# Patient Record
Sex: MALE | Race: WHITE | HISPANIC OR LATINO | Employment: FULL TIME | URBAN - METROPOLITAN AREA
[De-identification: names, ages, dates, MRNs, and addresses within clinical notes are randomized per-mention and may not be internally consistent; named-entity substitution may affect disease eponyms.]

---

## 2017-01-18 ENCOUNTER — HOSPITAL ENCOUNTER (EMERGENCY)
Facility: HOSPITAL | Age: 52
Discharge: HOME | End: 2017-01-18
Attending: EMERGENCY MEDICINE

## 2017-01-18 VITALS
BODY MASS INDEX: 30.06 KG/M2 | HEART RATE: 60 BPM | TEMPERATURE: 98 F | SYSTOLIC BLOOD PRESSURE: 146 MMHG | OXYGEN SATURATION: 100 % | DIASTOLIC BLOOD PRESSURE: 66 MMHG | WEIGHT: 210 LBS | HEIGHT: 70 IN | RESPIRATION RATE: 20 BRPM

## 2017-01-18 DIAGNOSIS — R10.9 FLANK PAIN: ICD-10-CM

## 2017-01-18 DIAGNOSIS — R31.9 HEMATURIA: Primary | ICD-10-CM

## 2017-01-18 LAB
ALBUMIN SERPL BCP-MCNC: 4.4 G/DL
ALP SERPL-CCNC: 85 U/L
ALT SERPL W/O P-5'-P-CCNC: 31 U/L
ANION GAP SERPL CALC-SCNC: 9 MMOL/L
AST SERPL-CCNC: 22 U/L
BASOPHILS # BLD AUTO: 0.03 K/UL
BASOPHILS NFR BLD: 0.5 %
BILIRUB SERPL-MCNC: 0.6 MG/DL
BILIRUB UR QL STRIP: NEGATIVE
BUN SERPL-MCNC: 22 MG/DL
CALCIUM SERPL-MCNC: 9.3 MG/DL
CHLORIDE SERPL-SCNC: 108 MMOL/L
CLARITY UR: CLEAR
CO2 SERPL-SCNC: 24 MMOL/L
COLOR UR: YELLOW
CREAT SERPL-MCNC: 1.3 MG/DL
DIFFERENTIAL METHOD: ABNORMAL
EOSINOPHIL # BLD AUTO: 0.2 K/UL
EOSINOPHIL NFR BLD: 3 %
ERYTHROCYTE [DISTWIDTH] IN BLOOD BY AUTOMATED COUNT: 12.4 %
EST. GFR  (AFRICAN AMERICAN): >60 ML/MIN/1.73 M^2
EST. GFR  (NON AFRICAN AMERICAN): >60 ML/MIN/1.73 M^2
GLUCOSE SERPL-MCNC: 144 MG/DL
GLUCOSE UR QL STRIP: NEGATIVE
HCT VFR BLD AUTO: 49.5 %
HGB BLD-MCNC: 16.8 G/DL
HGB UR QL STRIP: ABNORMAL
KETONES UR QL STRIP: NEGATIVE
LEUKOCYTE ESTERASE UR QL STRIP: NEGATIVE
LIPASE SERPL-CCNC: 15 U/L
LYMPHOCYTES # BLD AUTO: 1.4 K/UL
LYMPHOCYTES NFR BLD: 20.7 %
MCH RBC QN AUTO: 30.9 PG
MCHC RBC AUTO-ENTMCNC: 33.9 %
MCV RBC AUTO: 91 FL
MICROSCOPIC COMMENT: ABNORMAL
MONOCYTES # BLD AUTO: 0.4 K/UL
MONOCYTES NFR BLD: 6.2 %
NEUTROPHILS # BLD AUTO: 4.6 K/UL
NEUTROPHILS NFR BLD: 69.6 %
NITRITE UR QL STRIP: NEGATIVE
PH UR STRIP: 5 [PH] (ref 5–8)
PLATELET # BLD AUTO: 148 K/UL
PMV BLD AUTO: 10.9 FL
POTASSIUM SERPL-SCNC: 3.9 MMOL/L
PROT SERPL-MCNC: 7.8 G/DL
PROT UR QL STRIP: NEGATIVE
RBC # BLD AUTO: 5.44 M/UL
RBC #/AREA URNS HPF: 12 /HPF (ref 0–4)
SODIUM SERPL-SCNC: 141 MMOL/L
SP GR UR STRIP: 1.01 (ref 1–1.03)
URN SPEC COLLECT METH UR: ABNORMAL
UROBILINOGEN UR STRIP-ACNC: NEGATIVE EU/DL
WBC # BLD AUTO: 6.61 K/UL

## 2017-01-18 PROCEDURE — 63600175 PHARM REV CODE 636 W HCPCS: Performed by: EMERGENCY MEDICINE

## 2017-01-18 PROCEDURE — 83690 ASSAY OF LIPASE: CPT

## 2017-01-18 PROCEDURE — 80053 COMPREHEN METABOLIC PANEL: CPT

## 2017-01-18 PROCEDURE — 96361 HYDRATE IV INFUSION ADD-ON: CPT

## 2017-01-18 PROCEDURE — 99284 EMERGENCY DEPT VISIT MOD MDM: CPT | Mod: 25

## 2017-01-18 PROCEDURE — 96375 TX/PRO/DX INJ NEW DRUG ADDON: CPT

## 2017-01-18 PROCEDURE — 96365 THER/PROPH/DIAG IV INF INIT: CPT

## 2017-01-18 PROCEDURE — 25000003 PHARM REV CODE 250: Performed by: EMERGENCY MEDICINE

## 2017-01-18 PROCEDURE — 85025 COMPLETE CBC W/AUTO DIFF WBC: CPT

## 2017-01-18 PROCEDURE — 81000 URINALYSIS NONAUTO W/SCOPE: CPT

## 2017-01-18 RX ORDER — ONDANSETRON 4 MG/1
4 TABLET, FILM COATED ORAL EVERY 8 HOURS PRN
Qty: 12 TABLET | Refills: 0 | Status: SHIPPED | OUTPATIENT
Start: 2017-01-18

## 2017-01-18 RX ORDER — ETODOLAC 400 MG/1
400 TABLET, FILM COATED ORAL 2 TIMES DAILY
Qty: 30 TABLET | Refills: 0 | Status: SHIPPED | OUTPATIENT
Start: 2017-01-18

## 2017-01-18 RX ORDER — KETOROLAC TROMETHAMINE 30 MG/ML
15 INJECTION, SOLUTION INTRAMUSCULAR; INTRAVENOUS
Status: COMPLETED | OUTPATIENT
Start: 2017-01-18 | End: 2017-01-18

## 2017-01-18 RX ORDER — HYDROMORPHONE HYDROCHLORIDE 2 MG/ML
1 INJECTION, SOLUTION INTRAMUSCULAR; INTRAVENOUS; SUBCUTANEOUS
Status: COMPLETED | OUTPATIENT
Start: 2017-01-18 | End: 2017-01-18

## 2017-01-18 RX ORDER — ONDANSETRON 2 MG/ML
4 INJECTION INTRAMUSCULAR; INTRAVENOUS
Status: COMPLETED | OUTPATIENT
Start: 2017-01-18 | End: 2017-01-18

## 2017-01-18 RX ADMIN — SODIUM CHLORIDE 1000 ML: 0.9 INJECTION, SOLUTION INTRAVENOUS at 08:01

## 2017-01-18 RX ADMIN — ONDANSETRON 4 MG: 2 INJECTION INTRAMUSCULAR; INTRAVENOUS at 08:01

## 2017-01-18 RX ADMIN — KETOROLAC TROMETHAMINE 15 MG: 30 INJECTION, SOLUTION INTRAMUSCULAR at 08:01

## 2017-01-18 RX ADMIN — HYDROMORPHONE HYDROCHLORIDE 1 MG: 2 INJECTION INTRAMUSCULAR; INTRAVENOUS; SUBCUTANEOUS at 08:01

## 2017-01-18 RX ADMIN — PROMETHAZINE HYDROCHLORIDE 25 MG: 25 INJECTION INTRAMUSCULAR; INTRAVENOUS at 09:01

## 2017-01-18 NOTE — ED AVS SNAPSHOT
[facility]  [address]  [place] [address]               Bill Hathaway   2017  7:45 AM   ED    Descripción:  Male : 1965   Departamento:  [facility]           Schroeder Cuidado fue coordinado por:     Provider Role From To    Sandeep Louis MD Attending Provider 17 0745 --      Razón de la tammie     Abdominal Pain           Diagnósticos de Esta Visita        Comentarios    Hematuria    -  Primario     Flank pain           ED Disposition     Ninguna           Lista de tareas           Información de seguimiento     Realice un seguimiento con:  ERASMO Golden MD    Cómo:  Luciano hubert tammie lo antes posible    Cuándo:  2017    Especialidad:  Urology    Por qué:  As needed    Información de contacto:    [address] [place] [address]  [address]  [place] [address]  [phone]        Recetas para recoger        Disp Refills Start End    etodolac (LODINE) 400 MG tablet 30 tablet 0 2017     Take 1 tablet (400 mg total) by mouth 2 (two) times daily. - Oral    ondansetron (ZOFRAN) 4 MG tablet 12 tablet 0 2017     Take 1 tablet (4 mg total) by mouth every 8 (eight) hours as needed. - Oral      [facility]     [facility] - Asistencia   Enfermeras registradas de [facility] pueden ayudarle a reservar hubert tammie, proveer educación para la ct, asesoría clínica, y otros servicios de asesoramiento.   Llame para bryanna servicio gratuito a [phone].             Medicamentos           EMPEZAR a ibrahima estos medicamentos NUEVOS        Refills    etodolac (LODINE) 400 MG tablet 0    Sig: Take 1 tablet (400 mg total) by mouth 2 (two) times daily.    Categoría: Print    Vía: Oral    ondansetron (ZOFRAN) 4 MG tablet 0    Sig: Take 1 tablet (4 mg total) by mouth every 8 (eight) hours as needed.    Categoría: Print    Vía: Oral      These medications were administered today        Dose Freq    ketorolac injection 15 mg 15 mg  "ED 1 Time    Sig: Inject 15 mg into the vein ED 1 Time.    Categoría: Normal    Vía: Intravenous    ondansetron injection 4 mg 4 mg ED 1 Time    Sig: Inject 4 mg into the vein ED 1 Time.    Categoría: Normal    Vía: Intravenous    sodium chloride 0.9% bolus 1,000 mL 1,000 mL ED 1 Time    Sig: Inject 1,000 mLs into the vein ED 1 Time.    Categoría: Normal    Vía: Intravenous    hydromorphone (PF) injection 1 mg 1 mg ED 1 Time    Sig: Inject 0.5 mLs (1 mg total) into the vein ED 1 Time.    Categoría: Normal    Vía: Intravenous    promethazine (PHENERGAN) 25 mg in dextrose 5 % 50 mL IVPB 25 mg ED 1 Time    Sig: Inject 25 mg into the vein ED 1 Time.    Categoría: Normal    Vía: Intravenous           Verifique que la siguiente lista de medicamentos es hubert representación exacta de los medicamentos que está tomando actualmente. Si no hay ningunos reportados, la lista puede estar en herr. Si no es correcta, por favor póngase en contacto con pimentel proveedor de atención médica. Lleve esta lista con usted en jorge de emergencia.           Medicamentos Actuales     etodolac (LODINE) 400 MG tablet Take 1 tablet (400 mg total) by mouth 2 (two) times daily.    ondansetron (ZOFRAN) 4 MG tablet Take 1 tablet (4 mg total) by mouth every 8 (eight) hours as needed.    promethazine (PHENERGAN) 25 mg in dextrose 5 % 50 mL IVPB Inject 25 mg into the vein ED 1 Time.           Información de referencia clínica           Ginger signos vitales arthur     PS Pulso Temperatura Resp Vidalia Peso    138/86 (BP Location: Left arm, Patient Position: Sitting) 62 97.8 °F (36.6 °C) (Oral) 18 5' 10" (1.778 m) 95.3 kg (210 lb)    SpO2 BMI (IMC)                96% 30.13 kg/m2          Alergias     A partir del:  1/18/2017        No Known Allergies      Vacunas     Administradas en la fecha de la visita:  1/18/2017        None      ED Micro, Lab, POCT     Start Ordered       Status Ordering Provider    01/18/17 0753 01/18/17 0753  CBC auto differential  STAT      " Final result     17 0753 17 0753  Comprehensive metabolic panel  STAT      Final result     17 0753 17 0753  Lipase  STAT      Final result     17 0753 17 0753  Urinalysis  STAT      Final result     17 0753 17 0753  Urinalysis Microscopic  Once      Final result       ED Imaging Orders     Start Ordered       Status Ordering Provider    17 0753 17 0753  CT Renal Stone Study ABD Pelvis WO  1 time imaging      Final result       Referencias/Adjuntos de jose     HEMATURIA (Maltese)    HEMATURIA: POSSIBLE CAUSES (Maltese)       [facility] cumple con las leyes federales aplicables de derechos civiles y no discrimina por motivos de kalina, color, origen nacional, edad, discapacidad, o sexo.        Language Assistance Services     ATTENTION: Language assistance services are available, free of charge. Please call [phone].      ATENCIÓN: Si habla español, tiene a pimentel disposición servicios gratuitos de asistencia lingüística. Llame al [phone].     CHÚ Ý: N?u b?n nói Ti?ng Vi?t, có các d?ch v? h? tr? ngôn ng? mi?n phí dành cho b?n. G?i s? [phone].                      [facility]  [address] [place] [address]               Bill Hathaway   2017  7:45 AM   ED    Description:  Male : 1965   Department:  [facility]           Your Care was Coordinated By:     Provider Role From To    Sandeep Louis MD Attending Provider 17 0751 --      Reason for Visit     Abdominal Pain           Diagnoses this Visit        Comments    Hematuria    -  Primary     Flank pain           ED Disposition     None           To Do List           Follow-up Information     Follow up with ERASMO Golden MD. Schedule an appointment as soon as possible for a visit in 1 week.    Specialty:  Urology    Why:  As needed    Contact information:    [address]  [address]  [place]  [address]  [phone]         These Medications        Disp Refills Start End    etodolac (LODINE) 400 MG tablet 30 tablet 0 1/18/2017     Take 1 tablet (400 mg total) by mouth 2 (two) times daily. - Oral    ondansetron (ZOFRAN) 4 MG tablet 12 tablet 0 1/18/2017     Take 1 tablet (4 mg total) by mouth every 8 (eight) hours as needed. - Oral      [facility]     [facility] Care [facility] - 24/7 Assistance  Registered nurses in the [facility] provide clinical advisement, health education, appointment booking, and other advisory services.  Call for this free service at [phone].             Medications           START taking these NEW medications        Refills    etodolac (LODINE) 400 MG tablet 0    Sig: Take 1 tablet (400 mg total) by mouth 2 (two) times daily.    Class: Print    Route: Oral    ondansetron (ZOFRAN) 4 MG tablet 0    Sig: Take 1 tablet (4 mg total) by mouth every 8 (eight) hours as needed.    Class: Print    Route: Oral      These medications were administered today        Dose Freq    ketorolac injection 15 mg 15 mg ED 1 Time    Sig: Inject 15 mg into the vein ED 1 Time.    Class: Normal    Route: Intravenous    ondansetron injection 4 mg 4 mg ED 1 Time    Sig: Inject 4 mg into the vein ED 1 Time.    Class: Normal    Route: Intravenous    sodium chloride 0.9% bolus 1,000 mL 1,000 mL ED 1 Time    Sig: Inject 1,000 mLs into the vein ED 1 Time.    Class: Normal    Route: Intravenous    hydromorphone (PF) injection 1 mg 1 mg ED 1 Time    Sig: Inject 0.5 mLs (1 mg total) into the vein ED 1 Time.    Class: Normal    Route: Intravenous    promethazine (PHENERGAN) 25 mg in dextrose 5 % 50 mL IVPB 25 mg ED 1 Time    Sig: Inject 25 mg into the vein ED 1 Time.    Class: Normal    Route: Intravenous           Verify that the below list of medications is an accurate representation of the medications you are currently taking.  If none reported, the list may be blank. If incorrect,  "please contact your healthcare provider. Carry this list with you in case of emergency.           Current Medications     etodolac (LODINE) 400 MG tablet Take 1 tablet (400 mg total) by mouth 2 (two) times daily.    ondansetron (ZOFRAN) 4 MG tablet Take 1 tablet (4 mg total) by mouth every 8 (eight) hours as needed.    promethazine (PHENERGAN) 25 mg in dextrose 5 % 50 mL IVPB Inject 25 mg into the vein ED 1 Time.           Clinical Reference Information           Your Vitals Were     BP Pulse Temp Resp Height Weight    138/86 (BP Location: Left arm, Patient Position: Sitting) 62 97.8 °F (36.6 °C) (Oral) 18 5' 10" (1.778 m) 95.3 kg (210 lb)    SpO2 BMI                96% 30.13 kg/m2          Allergies as of 1/18/2017     No Known Allergies      Immunizations Administered on Date of Encounter - 1/18/2017     None      ED Micro, Lab, POCT     Start Ordered       Status Ordering Provider    01/18/17 0753 01/18/17 0753  CBC auto differential  STAT      Final result     01/18/17 0753 01/18/17 0753  Comprehensive metabolic panel  STAT      Final result     01/18/17 0753 01/18/17 0753  Lipase  STAT      Final result     01/18/17 0753 01/18/17 0753  Urinalysis  STAT      Final result     01/18/17 0753 01/18/17 0753  Urinalysis Microscopic  Once      Final result       ED Imaging Orders     Start Ordered       Status Ordering Provider    01/18/17 0753 01/18/17 0753  CT Renal Stone Study ABD Pelvis WO  1 time imaging      Final result       Discharge References/Attachments     HEMATURIA (Swiss)    HEMATURIA: POSSIBLE CAUSES (Swiss)       [facility] complies with applicable Federal civil rights laws and does not discriminate on the basis of race, color, national origin, age, disability, or sex.        Language Assistance Services     ATTENTION: Language assistance services are available, free of charge. Please call [phone].      ATENCIÓN: Si sunshinela hernan, tiene a pimentel disposición servicios gratuitos " de asistencia lingüística. Henry hitchcock [phone].     FREEMAN Ý: N?u b?n nói Ti?ng Vi?t, có các d?ch v? h? tr? ngôn ng? mi?n phí rebelh cho b?n. G?i s? [phone].

## 2017-01-18 NOTE — ED PROVIDER NOTES
"Encounter Date: 1/18/2017    SCRIBE #1 NOTE: I, Elver Shen, am scribing for, and in the presence of, Sandeep Louis MD. Other sections scribed: HPI, ROS.       History     Chief Complaint   Patient presents with    Abdominal Pain     reporting pain to left lower quad abdominal and left flank onset this morning, "comes and goes," states felt like he had to urinate this morning, but was unable to go "any more than a little bit," has been drinking fluids, last BM this morning, denies medical hx     Review of patient's allergies indicates:  No Known Allergies  HPI Comments: CC: Abdominal Pain  HPI: This 51 y.o. male presents to the ED c/o low abdominal pain and urinary urgency since earlier this morning. He reports difficulty urinating and a few moments of L flank pain when he attempted to void this morning. Sx are moderate and acute. He denies fever, vomiting, diarrhea, hematuria.      The history is provided by the patient.     History reviewed. No pertinent past medical history.  No past medical history pertinent negatives.  History reviewed. No pertinent past surgical history.  History reviewed. No pertinent family history.  Social History   Substance Use Topics    Smoking status: Never Smoker    Smokeless tobacco: None    Alcohol use No     Review of Systems   Constitutional: Negative for chills and fever.   HENT: Negative for congestion, rhinorrhea and sore throat.    Eyes: Negative for pain and visual disturbance.   Respiratory: Negative for cough and shortness of breath.    Cardiovascular: Negative for chest pain.   Gastrointestinal: Positive for abdominal pain. Negative for diarrhea and vomiting.   Genitourinary: Positive for difficulty urinating, flank pain (L) and urgency. Negative for dysuria.   Musculoskeletal: Negative for arthralgias and myalgias.   Skin: Negative for rash and wound.   Neurological: Negative for headaches.       Physical Exam   Initial Vitals   BP Pulse Resp Temp SpO2   01/18/17 " 0739 01/18/17 0739 01/18/17 0739 01/18/17 0739 01/18/17 0739   138/86 62 18 97.8 °F (36.6 °C) 96 %     Physical Exam    Nursing note and vitals reviewed.  Constitutional: He appears well-developed and well-nourished.   HENT:   Head: Normocephalic and atraumatic.   Eyes: EOM are normal. Pupils are equal, round, and reactive to light.   Cardiovascular: Normal rate, regular rhythm, normal heart sounds and intact distal pulses.   Pulmonary/Chest: Breath sounds normal. No respiratory distress. He has no wheezes. He has no rhonchi. He has no rales. He exhibits no tenderness.   Abdominal: Soft. Bowel sounds are normal. He exhibits no distension. There is tenderness (left flank, suprapubic).   Musculoskeletal: Normal range of motion. He exhibits no edema.   Neurological: He is alert and oriented to person, place, and time.   Skin: Skin is warm and dry.         ED Course   Procedures  Labs Reviewed   CBC W/ AUTO DIFFERENTIAL - Abnormal; Notable for the following:        Result Value    Platelets 148 (*)     All other components within normal limits   COMPREHENSIVE METABOLIC PANEL - Abnormal; Notable for the following:     Glucose 144 (*)     BUN, Bld 22 (*)     All other components within normal limits   URINALYSIS - Abnormal; Notable for the following:     Occult Blood UA 2+ (*)     All other components within normal limits   URINALYSIS MICROSCOPIC - Abnormal; Notable for the following:     RBC, UA 12 (*)     All other components within normal limits   LIPASE            MEDICAL DECISION MAKING    This is an emergent evaluation of the patient's symptoms.  Differential diagnoses includes: Renal colic, pyelonephritis, constipation, urinary obstruction. Room air pulse oximetry interpreted by me as normal. A decision was made to obtain the patient's medical records.  Records were not immediately available for review.   No leukocytosis or evidence of anemia.  Unremarkable metabolic panel.  Urinalysis significant for blood without  evidence of infection.  CT does not show any evidence of kidney stone, however there is mild dilatation of the left ureter and collecting system concern for recently passed stone.  I will treat the patient with anti-inflammatories and recommend outpatient urology follow-up for further evaluation.             Scribe Attestation:   Scribe #1: I performed the above scribed service and the documentation accurately describes the services I performed. I attest to the accuracy of the note.    Attending Attestation:           Physician Attestation for Scribe:  Physician Attestation Statement for Scribe #1: I, Sandeep Louis MD, reviewed documentation, as scribed by Elver Shen in my presence, and it is both accurate and complete.                 ED Course     Clinical Impression:   The primary encounter diagnosis was Hematuria. A diagnosis of Flank pain was also pertinent to this visit.          Sandeep Louis MD  01/18/17 0003